# Patient Record
Sex: FEMALE | Race: WHITE
[De-identification: names, ages, dates, MRNs, and addresses within clinical notes are randomized per-mention and may not be internally consistent; named-entity substitution may affect disease eponyms.]

---

## 2018-09-28 ENCOUNTER — HOSPITAL ENCOUNTER (OUTPATIENT)
Dept: HOSPITAL 61 - PCVCIMAG | Age: 78
Discharge: HOME | End: 2018-09-28
Attending: INTERNAL MEDICINE
Payer: MEDICARE

## 2018-09-28 DIAGNOSIS — Z79.82: ICD-10-CM

## 2018-09-28 DIAGNOSIS — E78.00: ICD-10-CM

## 2018-09-28 DIAGNOSIS — R60.9: ICD-10-CM

## 2018-09-28 DIAGNOSIS — I10: Primary | ICD-10-CM

## 2018-09-28 DIAGNOSIS — R07.89: ICD-10-CM

## 2018-09-28 PROCEDURE — G0463 HOSPITAL OUTPT CLINIC VISIT: HCPCS

## 2018-09-28 PROCEDURE — 93005 ELECTROCARDIOGRAM TRACING: CPT

## 2018-09-28 PROCEDURE — 93306 TTE W/DOPPLER COMPLETE: CPT

## 2018-09-28 NOTE — PCVCIMAG
--------------- APPROVED REPORT --------------





Study performed:  09/28/2018 11:26:46



EXAM: Comprehensive 2D, Doppler, and color-flow 

Echocardiogram

Patient Location: Echo lab

Status:  routine



BSA:         1.89

HR: 74 bpmBP:          120/76 mmHg

Rhythm: NSR



Other Information 

Study Quality: Adequate



Risk Factors: 

Cardiac Risk Factors:  Hyperlipidemia, HTN



Indications

Dyspnea 

Chest Pain

Elevated CA Score



2D Dimensions

IVSd:  12.43 (7-11mm)LVOT Diam:  19.00 (18-24mm) 

LVDd:  35.31 mm

PWd:  10.83 (7-11mm)Ascending Ao:  32.17 (22-36mm)

LVDs:  21.46 (25-40mm)

Left Atrium:  35.63 (27-40mm)

Aortic Root:  32.14 mm

LV Single Plane 4CH:  58.56 %

LV Single Plane 2CH:  57.81 %

Biplane EF:  60.2 %



Volumes

Left Atrial Volume (Systole)

Single Plane 4CH:  54.81 mLSingle Plane 2CH:  52.01 mL

LA ESV Index:  31.00 mL/m2



Aortic Valve

AoV Peak Clifton.:  1.27 m/s

AO Peak Gr.:  6.47 mmHgLVOT Max PG:  3.09 mmHg

LVOT Max V:  0.88 m/s

LAINE Vmax: 1.86 cm2



Mitral Valve

E/A Ratio:  0.7

MV Decel. Time:  273.62 ms

MV E Max Clifton.:  0.74 m/s

MV A Clifton.:  1.05 m/s

IVRT:  86.51 ms



TDI

E/Lateral E':  18.50E/Medial E':  14.80

Medial E' Clifton.:  0.05 m/s

Lateral E' Clifton.:  0.04 m/s



Pulmonary Valve

PV Peak Clifton.:  0.92 m/sPV Peak Gr.:  3.37 mmHg

AK End Vmax:  1.19 m/s



Pulmonary Vein

P Vein S:    0.45 m/sP Vein A:  0.25 m/s

P Vein D:   0.22 m/sP Vein A Dur.:  100.3 msec

P Vein S/D Ratio:  2.05



Tricuspid Valve

RAP Estimate:  7.00 mmHg



Left Ventricle

The left ventricle is normal size. There is normal LV segmental wall 

motion. Borderline concentric left ventricular hypertrophy. Left 

ventricular systolic function is normal. The left ventricular 

ejection fraction is within the normal range. LVEF is 55-60%. Grade I 

- abnormal relaxation pattern.



Right Ventricle

The right ventricle is normal size. The right ventricular systolic 

function is normal.



Atria

The left atrium size is normal. The right atrium size is 

normal.



Aortic Valve

The Aortic valve is mildly sclerotic. No aortic regurgitation is 

present. There is no aortic valvular stenosis.



Mitral Valve

There is mitral annular calcification. There is no mitral valve 

regurgitation noted. No evidence of mitral valve stenosis.



Tricuspid Valve

The tricuspid valve is normal in structure. There is no tricuspid 

valve regurgitation noted.



Pulmonic Valve

The pulmonary valve is normal in structure. Trace pulmonic 

regurgitation.



Great Vessels

The aortic root is normal in size. IVC is normal in size and 

collapses &gt;50% with inspiration.



Pericardium

There is no pericardial effusion.



&lt;Conclusion&gt;

The left ventricle is normal size.

Left ventricular systolic function is normal.

Grade I - abnormal relaxation pattern.

The right ventricle is normal size.

The left atrium size is normal.

The Aortic valve is mildly sclerotic.

There is no aortic valvular stenosis.

There is mitral annular calcification.

There is no mitral valve regurgitation noted.

There is no tricuspid valve regurgitation noted.

## 2018-10-02 ENCOUNTER — HOSPITAL ENCOUNTER (OUTPATIENT)
Dept: HOSPITAL 61 - PCVCIMAG | Age: 78
Discharge: HOME | End: 2018-10-02
Attending: INTERNAL MEDICINE
Payer: MEDICARE

## 2018-10-02 DIAGNOSIS — Z79.82: ICD-10-CM

## 2018-10-02 DIAGNOSIS — E78.5: ICD-10-CM

## 2018-10-02 DIAGNOSIS — R07.9: ICD-10-CM

## 2018-10-02 DIAGNOSIS — R06.09: ICD-10-CM

## 2018-10-02 DIAGNOSIS — I10: Primary | ICD-10-CM

## 2018-10-02 PROCEDURE — 93017 CV STRESS TEST TRACING ONLY: CPT

## 2018-10-02 PROCEDURE — G0463 HOSPITAL OUTPT CLINIC VISIT: HCPCS

## 2018-10-02 PROCEDURE — A9500 TC99M SESTAMIBI: HCPCS

## 2018-10-02 PROCEDURE — 78452 HT MUSCLE IMAGE SPECT MULT: CPT

## 2018-10-02 NOTE — PCVCIMAG
--------------- APPROVED REPORT --------------





Imaging Protocol: Rest Tc-99m/Stress Tc-99m 1 day

Study performed:  10/02/2018 13:40:59



Indication: Elevated Calcium Score, Chest Pain, Dyspnea

Patient Location: Out-Patient

Stress Nurse: Martita Madison RN

NM Tech:Lg Mcfarland NMKERRY



Ht: 5 ft 4 in Wt: 185 lbs BSA:  1.89 m2

HR: 57 bpm                      BP: 138/88 mmHg         BMI:  

31.75

Rhythm:  NSR



Medical History

Medical History: Age, Hyperlipidemia, HTN

Medications: ASA, Zyrtec, Boniva, Rosuvastatin

Allergies: Neosporin



Resting Data

Rest SPECT myocardial perfusion imaging was performed in supine 

position 45 minutes following the intravenous injection of 10.4 mCi 

of Tc-99m Sestamibi.

Time of rest injection: 1300     Date: 10/02/2018

Administration Route: IV

Administration Site: Left AC



Pharmacologic Stress

Pharmacologic stress test was performed by injecting Regadenoson 0.4 

mg IV push over 10-15 seconds immediately followed by the intravenous 

injection of 34.8 mCi of Tc-99m Sestamibi.

Time of stress injection: 1415     Date: 10/02/2018

Administration Route: IV

Administration Site: Left AC

Gated Stress SPECT was performed 45 minutes after stress 

injection.

The images were gated to evaluate regional wall motion and calculate 

left ventricular ejection fraction. 



Stress Test Details

Stress Test:  Pharmacologic stress testing performed using 0.4 mg of 

regadenoson per 5 mL given IV over 10 seconds.

  Reason for pharmacologic stress test: physical limitation.



HRMax Heart Rate (APMHR): 145 bpm 

Resting HR:            57 bpmTarget HR (85% APMHR): 123 bpm

Max HR Achieved:  94 bpm

% of APMHR:         64

Recovery HR:            67 bpm



BP

Resting BP:  138/88 mmHg



Recovery BP:       112/62 mmHg

ECG

Resting ECG:  NSR

Stress ECG:     NSR

ST Change: Non-ischemic

Arrhythmia:    PAC's

Recovery ECG: NSR, PAC



Clinical

Reason for Termination: Completed protocol

Stress Symptoms: Headache

Symptoms resolved with caffeine.



Study Quality

Study: Good



Study Data

Post stress, the left ventricular ejection was 70%..

SSS: 6

SRS: 6

SDS: 2

TID = 0.92.



Perfusion

There is a medium area of mildly reduced uptake in the distal segment 

of the inferolateral wall which is seen on the stress images and 

improves on the resting images. This area thickens and moves normally 

and is most consistent with ischemia versus artifact.



Wall Motion

Normal left ventricular wall motion.



Nuclear Conclusion

ECG Findings: negative for ischemia 

Clinical Findings: non-diagnostic 

Nuclear Findings: equivocal 

Exercise Capacity: not assessed

Left Ventricular Function: normal 

There is a partially reversible defect in the distal inferolateral 

segment, may be related to artifact but ischemia could not be 

excluded.

There is normal global and segmental LV systolic function.

## 2018-10-26 ENCOUNTER — HOSPITAL ENCOUNTER (OUTPATIENT)
Dept: HOSPITAL 61 - PCVCIMAG | Age: 78
Discharge: HOME | End: 2018-10-26
Attending: INTERNAL MEDICINE
Payer: MEDICARE

## 2018-10-26 DIAGNOSIS — E78.5: ICD-10-CM

## 2018-10-26 DIAGNOSIS — I65.23: Primary | ICD-10-CM

## 2018-10-26 DIAGNOSIS — I25.10: ICD-10-CM

## 2018-10-26 DIAGNOSIS — I10: ICD-10-CM

## 2018-10-26 PROCEDURE — 93880 EXTRACRANIAL BILAT STUDY: CPT

## 2018-10-26 NOTE — PCVCIMAG
EXAM: BILATERAL CAROTID DUPLEX



INDICATION: Carotid Occlusive Disease.



FINDINGS: 

Doppler Measurements (centimeters per second):



RIGHT:  Peak CCA-52, Peak ECA-94, Diastolic ICA-13, Peak ICA-66,

ICA/CCA Ratio-1.3.



LEFT:  Peak CCA-51, Peak ECA-153, Diastolic ICA-19, Peak ICA-95,

ICA/CCA Ratio-1.9.



RIGHT CAROTID: The carotid bulb has mild plaque. The proximal internal

carotid artery shows <40% stenosis. The common carotid artery shows no

significant stenosis. The external carotid artery shows no significant

stenosis.



LEFT CAROTID:  The carotid bulb has moderate plaque. The proximal

internal carotid artery shows 40-50% stenosis. The common carotid

artery shows no significant stenosis. The external carotid artery

shows 50% stenosis.



Antegrade flow in both vertebral arteries.



IMPRESSION: 

<40% stenosis of the right internal carotid artery with mild plaque.

40-50% stenosis of the left internal carotid artery with moderate

plaque.



LOC:[place] [id number]

## 2020-07-23 ENCOUNTER — APPOINTMENT (RX ONLY)
Dept: URBAN - METROPOLITAN AREA CLINIC 142 | Facility: CLINIC | Age: 80
Setting detail: DERMATOLOGY
End: 2020-07-23

## 2020-07-23 DIAGNOSIS — L57.8 OTHER SKIN CHANGES DUE TO CHRONIC EXPOSURE TO NONIONIZING RADIATION: ICD-10-CM

## 2020-07-23 DIAGNOSIS — L81.4 OTHER MELANIN HYPERPIGMENTATION: ICD-10-CM

## 2020-07-23 DIAGNOSIS — B35.3 TINEA PEDIS: ICD-10-CM

## 2020-07-23 DIAGNOSIS — L57.0 ACTINIC KERATOSIS: ICD-10-CM

## 2020-07-23 DIAGNOSIS — Z71.89 OTHER SPECIFIED COUNSELING: ICD-10-CM

## 2020-07-23 DIAGNOSIS — L82.1 OTHER SEBORRHEIC KERATOSIS: ICD-10-CM

## 2020-07-23 PROCEDURE — ? COUNSELING

## 2020-07-23 PROCEDURE — 99203 OFFICE O/P NEW LOW 30 MIN: CPT

## 2020-07-23 PROCEDURE — ? LIQUID NITROGEN (COSMETIC)

## 2020-07-23 PROCEDURE — ? PRESCRIPTION

## 2020-07-23 RX ORDER — KETOCONAZOLE 20 MG/G
CREAM TOPICAL
Qty: 1 | Refills: 5 | Status: ERX | COMMUNITY
Start: 2020-07-23

## 2020-07-23 RX ADMIN — KETOCONAZOLE: 20 CREAM TOPICAL at 00:00

## 2020-07-23 ASSESSMENT — LOCATION ZONE DERM
LOCATION ZONE: TRUNK
LOCATION ZONE: FACE
LOCATION ZONE: TOE
LOCATION ZONE: LIP

## 2020-07-23 ASSESSMENT — LOCATION DETAILED DESCRIPTION DERM
LOCATION DETAILED: LEFT MEDIAL FOREHEAD
LOCATION DETAILED: LEFT MEDIAL SUPERIOR CHEST
LOCATION DETAILED: RIGHT SUPERIOR VERMILION LIP
LOCATION DETAILED: RIGHT DORSAL GREAT TOE
LOCATION DETAILED: RIGHT MEDIAL MALAR CHEEK
LOCATION DETAILED: LEFT INFERIOR TEMPLE
LOCATION DETAILED: RIGHT INFERIOR FOREHEAD
LOCATION DETAILED: RIGHT MEDIAL BREAST 2-3:00 REGION
LOCATION DETAILED: LEFT LATERAL FOREHEAD
LOCATION DETAILED: LEFT FOREHEAD

## 2020-07-23 ASSESSMENT — LOCATION SIMPLE DESCRIPTION DERM
LOCATION SIMPLE: CHEST
LOCATION SIMPLE: LEFT FOREHEAD
LOCATION SIMPLE: RIGHT BREAST
LOCATION SIMPLE: RIGHT CHEEK
LOCATION SIMPLE: RIGHT LIP
LOCATION SIMPLE: RIGHT FOREHEAD
LOCATION SIMPLE: RIGHT GREAT TOE
LOCATION SIMPLE: LEFT TEMPLE

## 2020-07-23 ASSESSMENT — PAIN INTENSITY VAS: HOW INTENSE IS YOUR PAIN 0 BEING NO PAIN, 10 BEING THE MOST SEVERE PAIN POSSIBLE?: NO PAIN

## 2020-07-23 NOTE — PROCEDURE: LIQUID NITROGEN (COSMETIC)
Duration Of Freeze Thaw-Cycle (Seconds): 0
Price (Use Numbers Only, No Special Characters Or $): 150.00
Render Post Care In The Note?: yes
Consent: The patient's consent was obtained including but not limited to risks of crusting, scabbing, blistering, scarring, darker or lighter pigmentary change, recurrence, incomplete removal and infection.
Total Number Of Lesions Treated: 5
Detail Level: Zone
Post-Care Instructions: I reviewed with the patient in detail post-care instructions. Patient is to wear sunprotection, and avoid picking at any of the treated lesions. Pt may apply Vaseline to crusted or scabbing areas.